# Patient Record
(demographics unavailable — no encounter records)

---

## 2024-12-14 NOTE — PHYSICAL EXAM
[Normal Sclera/Conjunctiva] : normal sclera/conjunctiva [Normal Outer Ear/Nose] : the outer ears and nose were normal in appearance [No JVD] : no jugular venous distention [No Respiratory Distress] : no respiratory distress  [No Accessory Muscle Use] : no accessory muscle use [Clear to Auscultation] : lungs were clear to auscultation bilaterally [Normal Rate] : normal rate  [Regular Rhythm] : with a regular rhythm [No Edema] : there was no peripheral edema [Soft] : abdomen soft [Non Tender] : non-tender [Non-distended] : non-distended [No Focal Deficits] : no focal deficits [Normal] : affect was normal and insight and judgment were intact

## 2024-12-18 NOTE — ASSESSMENT
[Patient Optimized for Surgery] : Patient optimized for surgery [No Further Testing Recommended] : no further testing recommended [As per surgery] : as per surgery [FreeTextEntry4] : 71M w/ Dilated Aortic Root, Knee OA, thalassemia trait, Hx of gout is coming in for [facility] for R Knee replacement.  #Preop exam -Reviewed PST results of CBC, CMP, PT/PTT/INR - results stable -EKG reviewed from PST, no acute ST changes, sinus rhythm   Patient is medically optimized for this intermediate risk surgery.

## 2024-12-18 NOTE — ASSESSMENT
[Patient Optimized for Surgery] : Patient optimized for surgery [No Further Testing Recommended] : no further testing recommended [As per surgery] : as per surgery [FreeTextEntry4] : 71M w/ Dilated Aortic Root, Knee OA, thalassemia trait, Hx of gout is coming in for [facility] for R Knee replacement.  #Preop exam -Reviewed PST results of CBC, CMP, PT/PTT/INR - results stable -EKG reviewed from PST, no acute ST changes, sinus rhythm   Patient is medically optimized for this intermediate risk surgery.

## 2024-12-18 NOTE — HISTORY OF PRESENT ILLNESS
[No Adverse Anesthesia Reaction] : no adverse anesthesia reaction in self or family member [(Patient denies any chest pain, claudication, dyspnea on exertion, orthopnea, palpitations or syncope)] : Patient denies any chest pain, claudication, dyspnea on exertion, orthopnea, palpitations or syncope [Moderate (4-6 METs)] : Moderate (4-6 METs) [Aortic Stenosis] : no aortic stenosis [Atrial Fibrillation] : no atrial fibrillation [Coronary Artery Disease] : no coronary artery disease [Recent Myocardial Infarction] : no recent myocardial infarction [Implantable Device/Pacemaker] : no implantable device/pacemaker [Asthma] : no asthma [COPD] : no COPD [Sleep Apnea] : no sleep apnea [Smoker] : not a smoker [Family Member] : no family member with adverse anesthesia reaction/sudden death [Self] : no previous adverse anesthesia reaction [Chronic Anticoagulation] : no chronic anticoagulation [Chronic Kidney Disease] : no chronic kidney disease [Diabetes] : no diabetes [FreeTextEntry1] : R knee replacement  [FreeTextEntry2] : 12/19/24 [FreeTextEntry3] : Dr. Sam Taylor  [FreeTextEntry4] : 71M w/ Dilated Aortic Root, Knee OA, thalassemia trait, Hx of gout is coming in for [facility] for R Knee replacement.  Follows w/ Cards, last saw last year, no change in aortic root, recommended returning in 5 years.   Feels well, no acute complaints

## 2024-12-18 NOTE — HISTORY OF PRESENT ILLNESS
[No Adverse Anesthesia Reaction] : no adverse anesthesia reaction in self or family member [(Patient denies any chest pain, claudication, dyspnea on exertion, orthopnea, palpitations or syncope)] : Patient denies any chest pain, claudication, dyspnea on exertion, orthopnea, palpitations or syncope [Moderate (4-6 METs)] : Moderate (4-6 METs) [Aortic Stenosis] : no aortic stenosis [Atrial Fibrillation] : no atrial fibrillation [Coronary Artery Disease] : no coronary artery disease [Recent Myocardial Infarction] : no recent myocardial infarction [Implantable Device/Pacemaker] : no implantable device/pacemaker [Asthma] : no asthma [COPD] : no COPD [Sleep Apnea] : no sleep apnea [Smoker] : not a smoker [Family Member] : no family member with adverse anesthesia reaction/sudden death [Self] : no previous adverse anesthesia reaction [Chronic Anticoagulation] : no chronic anticoagulation [Chronic Kidney Disease] : no chronic kidney disease [Diabetes] : no diabetes [FreeTextEntry1] : R knee replacement  [FreeTextEntry2] : 12/19/24 [FreeTextEntry3] : Dr. Sam Taylor  [FreeTextEntry4] : 71M w/ Dilated Aortic Root, Knee OA, thalassemia trait, Hx of gout is coming in for [facility] for R Knee replacement.  Follows w/ Cards, last saw last year, no change in aortic root, recommended returning in 5 years.   Feels well, no acute complaints

## 2024-12-18 NOTE — ASSESSMENT
[Patient Optimized for Surgery] : Patient optimized for surgery [No Further Testing Recommended] : no further testing recommended [As per surgery] : as per surgery [FreeTextEntry4] : 71M w/ Dilated Aortic Root, Knee OA, thalassemia trait, Hx of gout is coming in for [facility] for R Knee replacement.  #Preop exam -Reviewed PST results of CBC, CMP, PT/PTT/INR - results stable -EKG reviewed from PST, no acute ST changes, sinus rhythm   Patient is medically optimized for this intermediate risk surgery.

## 2024-12-18 NOTE — HISTORY OF PRESENT ILLNESS
[No Adverse Anesthesia Reaction] : no adverse anesthesia reaction in self or family member [(Patient denies any chest pain, claudication, dyspnea on exertion, orthopnea, palpitations or syncope)] : Patient denies any chest pain, claudication, dyspnea on exertion, orthopnea, palpitations or syncope [Moderate (4-6 METs)] : Moderate (4-6 METs) [Aortic Stenosis] : no aortic stenosis [Atrial Fibrillation] : no atrial fibrillation [Coronary Artery Disease] : no coronary artery disease [Recent Myocardial Infarction] : no recent myocardial infarction [Implantable Device/Pacemaker] : no implantable device/pacemaker [Asthma] : no asthma [COPD] : no COPD [Sleep Apnea] : no sleep apnea [Smoker] : not a smoker [Family Member] : no family member with adverse anesthesia reaction/sudden death [Self] : no previous adverse anesthesia reaction [Chronic Anticoagulation] : no chronic anticoagulation [Chronic Kidney Disease] : no chronic kidney disease [Diabetes] : no diabetes [FreeTextEntry1] : R knee replacement  [FreeTextEntry2] : 12/19/24 [FreeTextEntry3] : Dr. Sam Taylor  [FreeTextEntry4] : 71M w/ Dilated Aortic Root, Knee OA, thalassemia trait, Hx of gout is coming in for [facility] for R Knee replacement.  Follows w/ Cards, last saw last year, no change in aortic root, recommended returning in 5 years.   Feels well, no acute complaints